# Patient Record
Sex: MALE | Race: WHITE | NOT HISPANIC OR LATINO | Employment: OTHER | URBAN - METROPOLITAN AREA
[De-identification: names, ages, dates, MRNs, and addresses within clinical notes are randomized per-mention and may not be internally consistent; named-entity substitution may affect disease eponyms.]

---

## 2023-04-18 PROBLEM — R93.0 MASS IN REGION OF SELLA TURCICA PRESENT ON MAGNETIC RESONANCE IMAGING: Status: ACTIVE | Noted: 2023-04-18

## 2023-05-19 PROBLEM — D35.2 PITUITARY ADENOMA: Status: ACTIVE | Noted: 2023-05-19

## 2023-05-20 PROBLEM — E89.89 POSTOPERATIVE CENTRAL DIABETES INSIPIDUS: Status: ACTIVE | Noted: 2023-05-20

## 2023-05-20 PROBLEM — E23.2 POSTOPERATIVE CENTRAL DIABETES INSIPIDUS: Status: ACTIVE | Noted: 2023-05-20

## 2023-05-24 ENCOUNTER — PATIENT OUTREACH (OUTPATIENT)
Dept: ADMINISTRATIVE | Facility: CLINIC | Age: 68
End: 2023-05-24

## 2023-05-24 NOTE — PROGRESS NOTES
C3 nurse attempted to contact Neville Rivera  for a TCC post hospital discharge follow up call. No answer. The patient does not have a scheduled HOSFU appointment, and the pt does not have an [facility] PCP.

## 2023-05-25 NOTE — PROGRESS NOTES
C3 nurse spoke with Neville Rivera  for a TCC post hospital discharge follow up call. The patient reports does not have a scheduled HOSFU appointment. C3 nurse was unable to schedule [facility]. Patient advised to contact their PCP to schedule a HOSPFU within 5-7 days.

## 2023-05-25 NOTE — PROGRESS NOTES
2nd Attempt made to reach patient for TCC call. Left voicemail please call [phone] leave first name, last name, and  for Gerardo I will return your call.